# Patient Record
Sex: MALE | Race: BLACK OR AFRICAN AMERICAN | Employment: OTHER
[De-identification: names, ages, dates, MRNs, and addresses within clinical notes are randomized per-mention and may not be internally consistent; named-entity substitution may affect disease eponyms.]

---

## 2017-01-06 ENCOUNTER — TELEPHONE (OUTPATIENT)
Dept: CARDIOTHORACIC SURGERY | Facility: CLINIC | Age: 67
End: 2017-01-06

## 2017-01-06 DIAGNOSIS — I71.21 ASCENDING AORTIC ANEURYSM: Primary | ICD-10-CM

## 2017-08-22 ENCOUNTER — HOSPITAL ENCOUNTER (EMERGENCY)
Age: 67
Discharge: HOME | End: 2017-08-22
Attending: EMERGENCY MEDICINE
Payer: MEDICARE

## 2017-08-22 ENCOUNTER — APPOINTMENT (OUTPATIENT)
Dept: CT IMAGING | Age: 67
End: 2017-08-22
Payer: MEDICARE

## 2017-08-22 VITALS
BODY MASS INDEX: 23.17 KG/M2 | HEIGHT: 72 IN | DIASTOLIC BLOOD PRESSURE: 80 MMHG | SYSTOLIC BLOOD PRESSURE: 140 MMHG | TEMPERATURE: 97.3 F | OXYGEN SATURATION: 99 % | HEART RATE: 72 BPM | WEIGHT: 171.08 LBS | RESPIRATION RATE: 16 BRPM

## 2017-08-22 DIAGNOSIS — R51.9 HEADACHE, UNSPECIFIED HEADACHE TYPE: Primary | ICD-10-CM

## 2017-08-22 LAB
ABSOLUTE EOS #: 0 K/UL (ref 0–0.4)
ABSOLUTE LYMPH #: 0.9 K/UL (ref 1–4.8)
ABSOLUTE MONO #: 0.2 K/UL (ref 0.2–0.8)
ANION GAP SERPL CALCULATED.3IONS-SCNC: 11 MMOL/L (ref 9–17)
BASOPHILS # BLD: 1 %
BASOPHILS ABSOLUTE: 0 K/UL (ref 0–0.2)
BILIRUBIN URINE: NEGATIVE
BUN BLDV-MCNC: 17 MG/DL (ref 8–23)
BUN/CREAT BLD: 15 (ref 9–20)
CALCIUM SERPL-MCNC: 9.3 MG/DL (ref 8.6–10.4)
CHLORIDE BLD-SCNC: 102 MMOL/L (ref 98–107)
CO2: 27 MMOL/L (ref 20–31)
COLOR: YELLOW
COMMENT UA: NORMAL
CREAT SERPL-MCNC: 1.11 MG/DL (ref 0.7–1.2)
DIFFERENTIAL TYPE: ABNORMAL
EOSINOPHILS RELATIVE PERCENT: 1 %
GFR AFRICAN AMERICAN: >60 ML/MIN
GFR NON-AFRICAN AMERICAN: >60 ML/MIN
GFR SERPL CREATININE-BSD FRML MDRD: ABNORMAL ML/MIN/{1.73_M2}
GFR SERPL CREATININE-BSD FRML MDRD: ABNORMAL ML/MIN/{1.73_M2}
GLUCOSE BLD-MCNC: 102 MG/DL (ref 70–99)
GLUCOSE URINE: NEGATIVE
HCT VFR BLD CALC: 43.3 % (ref 41–53)
HEMOGLOBIN: 14.4 G/DL (ref 13.5–17.5)
KETONES, URINE: NEGATIVE
LEUKOCYTE ESTERASE, URINE: NEGATIVE
LYMPHOCYTES # BLD: 26 %
MCH RBC QN AUTO: 29.4 PG (ref 26–34)
MCHC RBC AUTO-ENTMCNC: 33.3 G/DL (ref 31–37)
MCV RBC AUTO: 88.4 FL (ref 80–100)
MONOCYTES # BLD: 7 %
NITRITE, URINE: NEGATIVE
PDW BLD-RTO: 13.5 % (ref 11.5–14.5)
PH UA: 6 (ref 5–8)
PLATELET # BLD: 151 K/UL (ref 130–400)
PLATELET ESTIMATE: ABNORMAL
PMV BLD AUTO: ABNORMAL FL (ref 6–12)
POTASSIUM SERPL-SCNC: 4.4 MMOL/L (ref 3.7–5.3)
PROTEIN UA: NEGATIVE
RBC # BLD: 4.9 M/UL (ref 4.5–5.9)
RBC # BLD: ABNORMAL 10*6/UL
SEG NEUTROPHILS: 65 %
SEGMENTED NEUTROPHILS ABSOLUTE COUNT: 2.5 K/UL (ref 1.8–7.7)
SODIUM BLD-SCNC: 140 MMOL/L (ref 135–144)
SPECIFIC GRAVITY UA: 1.01 (ref 1–1.03)
TURBIDITY: CLEAR
URINE HGB: NEGATIVE
UROBILINOGEN, URINE: NORMAL
WBC # BLD: 3.6 K/UL (ref 3.5–11)
WBC # BLD: ABNORMAL 10*3/UL

## 2017-08-22 PROCEDURE — 2580000003 HC RX 258: Performed by: NURSE PRACTITIONER

## 2017-08-22 PROCEDURE — 85025 COMPLETE CBC W/AUTO DIFF WBC: CPT

## 2017-08-22 PROCEDURE — 99284 EMERGENCY DEPT VISIT MOD MDM: CPT

## 2017-08-22 PROCEDURE — 81003 URINALYSIS AUTO W/O SCOPE: CPT

## 2017-08-22 PROCEDURE — 6360000002 HC RX W HCPCS: Performed by: NURSE PRACTITIONER

## 2017-08-22 PROCEDURE — 96374 THER/PROPH/DIAG INJ IV PUSH: CPT

## 2017-08-22 PROCEDURE — 80048 BASIC METABOLIC PNL TOTAL CA: CPT

## 2017-08-22 PROCEDURE — 6370000000 HC RX 637 (ALT 250 FOR IP): Performed by: NURSE PRACTITIONER

## 2017-08-22 PROCEDURE — 70450 CT HEAD/BRAIN W/O DYE: CPT

## 2017-08-22 RX ORDER — 0.9 % SODIUM CHLORIDE 0.9 %
1000 INTRAVENOUS SOLUTION INTRAVENOUS ONCE
Status: COMPLETED | OUTPATIENT
Start: 2017-08-22 | End: 2017-08-22

## 2017-08-22 RX ORDER — ACETAMINOPHEN 500 MG
1000 TABLET ORAL ONCE
Status: COMPLETED | OUTPATIENT
Start: 2017-08-22 | End: 2017-08-22

## 2017-08-22 RX ORDER — ONDANSETRON 2 MG/ML
4 INJECTION INTRAMUSCULAR; INTRAVENOUS ONCE
Status: COMPLETED | OUTPATIENT
Start: 2017-08-22 | End: 2017-08-22

## 2017-08-22 RX ORDER — ONDANSETRON 4 MG/1
4 TABLET, ORALLY DISINTEGRATING ORAL EVERY 8 HOURS PRN
Qty: 12 TABLET | Refills: 0 | Status: SHIPPED | OUTPATIENT
Start: 2017-08-22 | End: 2019-05-24

## 2017-08-22 RX ORDER — BUTALBITAL, ACETAMINOPHEN AND CAFFEINE 50; 325; 40 MG/1; MG/1; MG/1
1 TABLET ORAL EVERY 4 HOURS PRN
Qty: 12 TABLET | Refills: 0 | Status: SHIPPED | OUTPATIENT
Start: 2017-08-22 | End: 2019-05-24

## 2017-08-22 RX ADMIN — SODIUM CHLORIDE 1000 ML: 9 INJECTION, SOLUTION INTRAVENOUS at 12:23

## 2017-08-22 RX ADMIN — ONDANSETRON 4 MG: 2 INJECTION INTRAMUSCULAR; INTRAVENOUS at 12:24

## 2017-08-22 RX ADMIN — ACETAMINOPHEN 1000 MG: 500 TABLET ORAL at 12:25

## 2017-08-22 ASSESSMENT — ENCOUNTER SYMPTOMS
NAUSEA: 1
SORE THROAT: 0
ABDOMINAL PAIN: 0
VOMITING: 0
SINUS PRESSURE: 1
COUGH: 0
DIARRHEA: 1
SHORTNESS OF BREATH: 0

## 2017-08-22 ASSESSMENT — PAIN DESCRIPTION - LOCATION: LOCATION: HEAD

## 2017-08-22 ASSESSMENT — PAIN DESCRIPTION - PAIN TYPE: TYPE: ACUTE PAIN

## 2017-08-22 ASSESSMENT — PAIN DESCRIPTION - DESCRIPTORS: DESCRIPTORS: THROBBING

## 2017-08-22 ASSESSMENT — PAIN SCALES - GENERAL
PAINLEVEL_OUTOF10: 8
PAINLEVEL_OUTOF10: 10
PAINLEVEL_OUTOF10: 3

## 2018-01-30 ENCOUNTER — TELEPHONE (OUTPATIENT)
Dept: CARDIOTHORACIC SURGERY | Age: 68
End: 2018-01-30

## 2018-01-30 DIAGNOSIS — I71.21 ASCENDING AORTIC ANEURYSM: Primary | ICD-10-CM

## 2018-02-19 ENCOUNTER — HOSPITAL ENCOUNTER (OUTPATIENT)
Dept: CT IMAGING | Age: 68
Discharge: HOME | End: 2018-02-21
Payer: MEDICARE

## 2018-02-19 DIAGNOSIS — I71.21 ASCENDING AORTIC ANEURYSM: ICD-10-CM

## 2018-02-19 LAB
GFR NON-AFRICAN AMERICAN: 53 ML/MIN
GFR SERPL CREATININE-BSD FRML MDRD: >60 ML/MIN
GFR SERPL CREATININE-BSD FRML MDRD: ABNORMAL ML/MIN/{1.73_M2}
POC CREATININE: 1.34 MG/DL (ref 0.51–1.19)

## 2018-02-19 PROCEDURE — 6360000004 HC RX CONTRAST MEDICATION: Performed by: THORACIC SURGERY (CARDIOTHORACIC VASCULAR SURGERY)

## 2018-02-19 PROCEDURE — 82565 ASSAY OF CREATININE: CPT

## 2018-02-19 PROCEDURE — 71275 CT ANGIOGRAPHY CHEST: CPT

## 2018-02-19 RX ADMIN — IOPAMIDOL 75 ML: 755 INJECTION, SOLUTION INTRAVENOUS at 09:22

## 2018-03-05 ENCOUNTER — TELEPHONE (OUTPATIENT)
Dept: CARDIOTHORACIC SURGERY | Age: 68
End: 2018-03-05

## 2018-03-05 NOTE — TELEPHONE ENCOUNTER
Called patient per Dr Ernesto Flores and told him there was no change in his most recent CTA scan when compared to his previous CTA. Dr Ernesto Flores would like to repeat in 2 years. Patient verbalized understanding.

## 2019-01-16 ENCOUNTER — HOSPITAL ENCOUNTER (OUTPATIENT)
Dept: NON INVASIVE DIAGNOSTICS | Age: 69
Discharge: HOME | End: 2019-01-16
Payer: MEDICARE

## 2019-01-16 LAB
LV EF: 60 %
LVEF MODALITY: NORMAL

## 2019-01-16 PROCEDURE — 93306 TTE W/DOPPLER COMPLETE: CPT

## 2019-05-24 ENCOUNTER — HOSPITAL ENCOUNTER (EMERGENCY)
Age: 69
Discharge: HOME | End: 2019-05-24
Attending: EMERGENCY MEDICINE
Payer: MEDICARE

## 2019-05-24 VITALS
DIASTOLIC BLOOD PRESSURE: 92 MMHG | WEIGHT: 179.06 LBS | RESPIRATION RATE: 20 BRPM | BODY MASS INDEX: 24.25 KG/M2 | OXYGEN SATURATION: 99 % | SYSTOLIC BLOOD PRESSURE: 162 MMHG | HEART RATE: 66 BPM | HEIGHT: 72 IN | TEMPERATURE: 97.5 F

## 2019-05-24 DIAGNOSIS — M54.12 CERVICAL RADICULOPATHY: ICD-10-CM

## 2019-05-24 DIAGNOSIS — M62.838 TRAPEZIUS MUSCLE SPASM: Primary | ICD-10-CM

## 2019-05-24 PROCEDURE — 99283 EMERGENCY DEPT VISIT LOW MDM: CPT

## 2019-05-24 PROCEDURE — 6360000002 HC RX W HCPCS: Performed by: EMERGENCY MEDICINE

## 2019-05-24 PROCEDURE — 96372 THER/PROPH/DIAG INJ SC/IM: CPT

## 2019-05-24 RX ORDER — CYCLOBENZAPRINE HCL 10 MG
10 TABLET ORAL 2 TIMES DAILY PRN
Qty: 14 TABLET | Refills: 0 | Status: SHIPPED | OUTPATIENT
Start: 2019-05-24 | End: 2019-05-31

## 2019-05-24 RX ORDER — MONTELUKAST SODIUM 10 MG/1
10 TABLET ORAL NIGHTLY
COMMUNITY

## 2019-05-24 RX ORDER — IBUPROFEN 600 MG/1
600 TABLET ORAL
Qty: 20 TABLET | Refills: 0 | Status: SHIPPED | OUTPATIENT
Start: 2019-05-24 | End: 2019-05-31

## 2019-05-24 RX ORDER — KETOROLAC TROMETHAMINE 30 MG/ML
60 INJECTION, SOLUTION INTRAMUSCULAR; INTRAVENOUS ONCE
Status: COMPLETED | OUTPATIENT
Start: 2019-05-24 | End: 2019-05-24

## 2019-05-24 RX ADMIN — KETOROLAC TROMETHAMINE 60 MG: 30 INJECTION, SOLUTION INTRAMUSCULAR at 04:49

## 2019-05-24 ASSESSMENT — ENCOUNTER SYMPTOMS: BACK PAIN: 0

## 2019-05-24 ASSESSMENT — PAIN SCALES - GENERAL: PAINLEVEL_OUTOF10: 9

## 2019-05-24 NOTE — ED NOTES
Patient presents to ED via private auto c/o rt shoulder pain onset 6 days ago. Denies any injury. Stated he thought it would just get better on its own but it seems to have gotten worse. He has tried heat, cold and naprosyn with no relief. Stated there is some numbness and tingling in the palm of his hand.       Sukumar Garcia RN  05/24/19 3373

## 2019-05-24 NOTE — ED PROVIDER NOTES
EMERGENCY DEPARTMENT ENCOUNTER    Pt Name: Debbie Sharma  MRN: [id number]  [place] 1950  Date of evaluation: 5/24/19  CHIEF COMPLAINT       Chief Complaint   Patient presents with    Shoulder Pain     HISTORY OF PRESENT ILLNESS   Presents with c/o right shoulder pain since 5/18. Points to trapezius muscle. No injury. Moved down to deltoid. Some tingling in his palm. No change in . Denies tearing or ripping pain. H/o cervical fusion. The history is provided by the patient. Shoulder Problem   Location:  Shoulder  Shoulder location:  R shoulder  Injury: no    Pain details:     Quality: pulling. Severity:  Moderate    Onset quality:  Gradual    Duration:  6 days    Timing:  Constant    Progression:  Worsening  Handedness:  Right-handed  Relieved by:  Nothing  Worsened by: Movement and stretching area (direct pressure)  Ineffective treatments:  NSAIDs and heat (Naprosyn)  Associated symptoms: tingling (palm)    Associated symptoms: no back pain and no muscle weakness        REVIEW OF SYSTEMS     Review of Systems   Musculoskeletal: Negative for back pain. Neurological: Negative for weakness. PASTMEDICAL HISTORY     Past Medical History:   Diagnosis Date    Aortic aneurysm (HCC)     Asthma     GERD (gastroesophageal reflux disease)     Hypertension      SURGICAL HISTORY       Past Surgical History:   Procedure Laterality Date    SPINE SURGERY       CURRENT MEDICATIONS       Previous Medications    ALBUTEROL (PROVENTIL) (2.5 MG/3ML) 0.083% NEBULIZER SOLUTION    Take 2.5 mg by nebulization every 6 hours as needed. ALBUTEROL SULFATE (PROAIR HFA IN)    Inhale 2 puffs into the lungs daily. ATORVASTATIN (LIPITOR) 10 MG TABLET    Take 10 mg by mouth daily. LISINOPRIL (PRINIVIL;ZESTRIL) 5 MG TABLET    Take 10 mg by mouth daily. METOPROLOL (LOPRESSOR) 25 MG TABLET    Take 25 mg by mouth daily.     MONTELUKAST (SINGULAIR) 10 MG TABLET    Take 10 mg by mouth nightly    MULTIPLE VITAMINS-MINERALS (MULTIVITAMIN & MINERAL PO)    Take 1 tablet by mouth daily. OMEGA-3 FATTY ACIDS (OMEGA 3 PO)    Take 2 tablets by mouth daily. OMEPRAZOLE (PRILOSEC) 20 MG CAPSULE    Take 20 mg by mouth daily. ALLERGIES     has No Known Allergies. FAMILY HISTORY     indicated that his mother is . He indicated that his father is . He indicated that two of his three brothers are alive. SOCIAL HISTORY       Social History     Tobacco Use    Smoking status: Never Smoker    Smokeless tobacco: Never Used   Substance Use Topics    Alcohol use: No    Drug use: No     PHYSICAL EXAM     INITIAL VITALS: BP (!) 162/92   Pulse 66   Temp 97.5 °F (36.4 °C)   Resp 20   Ht 6' (1.829 m)   Wt 179 lb 1 oz (81.2 kg)   SpO2 99%   BMI 24.29 kg/m²    Physical Exam   Constitutional: He appears well-developed and well-nourished. He appears distressed. HENT:   Mouth/Throat: Oropharynx is clear and moist.   Eyes: Conjunctivae are normal. No scleral icterus. Neck: Muscular tenderness (right lower lateral) present. No spinous process tenderness present. Cardiovascular: Regular rhythm and intact distal pulses. Bradycardia present. Right radial pulse intact   Musculoskeletal: Normal range of motion. He exhibits tenderness. He exhibits no edema or deformity. Significant tenderness to right trapezius muscle   Neurological: He is alert. He has normal strength. No sensory deficit. Distal neuro function intact in right upper extremity   Skin: Capillary refill takes less than 2 seconds. No rash noted. No erythema. Psychiatric: He has a normal mood and affect. His behavior is normal.       MEDICAL DECISION MAKING:   Presentation c/w muscle strain or spasm. Endorses tingling in right hand. Could be related to cervical radiculopathy. H/o aneurysm of ascending aorta. Presentation is not c/w aortic pathology. Plan for Toradol and sling.  Sling provided at patient's request    Declines Rx for Prednisone. Rx for Motrin and Flexeril provided. Side effects discussed. Stop Naprosyn. Outpatient f/u indicated. Need for MRI C-spine left to discretion of f/u physician. Educated on s/s aortic dissection. Return immediately for any concerns. CRITICAL CARE:       PROCEDURES:    Procedures    DIAGNOSTIC RESULTS   EKG:All EKG's are interpreted by the Emergency Department Physician who either signs or Co-signs this chart in the absence of a cardiologist.        RADIOLOGY:All plain film, CT, MRI, and formal ultrasound images (except ED bedside ultrasound) are read by the radiologist, see reports below, unless otherwisenoted in MDM or here. No orders to display     LABS: All lab results were reviewed by myself, and all abnormals are listed below. Labs Reviewed - No data to display    EMERGENCY DEPARTMENTCOURSE:         Vitals:    Vitals:    05/24/19 0427 05/24/19 0505   BP: (!) 163/102 (!) 162/92   Pulse: 57 66   Resp: 20    Temp: 97.5 °F (36.4 °C)    SpO2: 99%    Weight: 179 lb 1 oz (81.2 kg)    Height: 6' (1.829 m)        The patient was given the following medications while in the emergency department:  Orders Placed This Encounter   Medications    ketorolac (TORADOL) injection 60 mg    ibuprofen (IBU) 600 MG tablet     Sig: Take 1 tablet by mouth 3 times daily (with meals) for 7 days     Dispense:  20 tablet     Refill:  0    cyclobenzaprine (FLEXERIL) 10 MG tablet     Sig: Take 1 tablet by mouth 2 times daily as needed for Muscle spasms     Dispense:  14 tablet     Refill:  0     CONSULTS:  None    FINAL IMPRESSION      1. Trapezius muscle spasm    2.  Cervical radiculopathy          DISPOSITION/PLAN   DISPOSITION Decision To Discharge 05/24/2019 05:24:50 AM      PATIENT REFERRED TO:  Sarbjit Chase MD  [address] [address]    In 1 week      DISCHARGE MEDICATIONS:  New Prescriptions    CYCLOBENZAPRINE (FLEXERIL) 10 MG TABLET    Take 1 tablet by mouth 2 times daily as needed for Muscle spasms    IBUPROFEN (IBU) 600 MG TABLET    Take 1 tablet by mouth 3 times daily (with meals) for 7 days     Belinda Celeste MD  Attending Emergency Physician                    Adiel Henderson MD  05/24/19 1834

## 2019-05-29 ENCOUNTER — HOSPITAL ENCOUNTER (OUTPATIENT)
Dept: GENERAL RADIOLOGY | Age: 69
Discharge: HOME | End: 2019-05-31
Payer: MEDICARE

## 2019-05-29 ENCOUNTER — HOSPITAL ENCOUNTER (OUTPATIENT)
Age: 69
Discharge: HOME | End: 2019-05-31
Payer: MEDICARE

## 2019-05-29 DIAGNOSIS — M25.511 ACUTE PAIN OF RIGHT SHOULDER: ICD-10-CM

## 2019-05-29 PROCEDURE — 73030 X-RAY EXAM OF SHOULDER: CPT

## 2020-01-07 DIAGNOSIS — I72.9 ANEURYSM (HCC): Primary | ICD-10-CM

## 2020-01-08 ENCOUNTER — HOSPITAL ENCOUNTER (OUTPATIENT)
Dept: CT IMAGING | Age: 70
Discharge: HOME | End: 2020-01-10
Payer: MEDICARE

## 2020-01-08 ENCOUNTER — HOSPITAL ENCOUNTER (OUTPATIENT)
Age: 70
Discharge: HOME | End: 2020-01-08
Payer: MEDICARE

## 2020-01-08 LAB
CREAT SERPL-MCNC: 1 MG/DL (ref 0.7–1.2)
GFR AFRICAN AMERICAN: >60 ML/MIN
GFR NON-AFRICAN AMERICAN: >60 ML/MIN
GFR SERPL CREATININE-BSD FRML MDRD: NORMAL ML/MIN/{1.73_M2}
GFR SERPL CREATININE-BSD FRML MDRD: NORMAL ML/MIN/{1.73_M2}

## 2020-01-08 PROCEDURE — 6360000004 HC RX CONTRAST MEDICATION: Performed by: INTERNAL MEDICINE

## 2020-01-08 PROCEDURE — 82565 ASSAY OF CREATININE: CPT

## 2020-01-08 PROCEDURE — 36415 COLL VENOUS BLD VENIPUNCTURE: CPT

## 2020-01-08 PROCEDURE — 71275 CT ANGIOGRAPHY CHEST: CPT

## 2020-01-08 RX ADMIN — IOHEXOL 100 ML: 350 INJECTION, SOLUTION INTRAVENOUS at 11:01

## 2020-02-27 ENCOUNTER — TELEPHONE (OUTPATIENT)
Dept: CARDIOTHORACIC SURGERY | Age: 70
End: 2020-02-27

## 2020-02-27 NOTE — TELEPHONE ENCOUNTER
Called pt LVM to call back to make a follow up appt with Dr. Vianey Radford pt was a Dr. Dai Hoang pt for ascending aortic aneurysm.  Pt got a CTA Cgest on 1/8/2020

## 2021-01-14 DIAGNOSIS — I71.20 THORACIC AORTIC ANEURYSM WITHOUT RUPTURE: Primary | ICD-10-CM

## 2021-01-18 ENCOUNTER — TELEPHONE (OUTPATIENT)
Dept: CARDIOTHORACIC SURGERY | Age: 71
End: 2021-01-18

## 2021-01-18 DIAGNOSIS — I71.20 THORACIC AORTIC ANEURYSM WITHOUT RUPTURE: Primary | ICD-10-CM

## 2021-01-18 DIAGNOSIS — I71.20 THORACIC AORTIC ANEURYSM WITHOUT RUPTURE (HCC): Primary | ICD-10-CM

## 2021-01-18 NOTE — TELEPHONE ENCOUNTER
Scheduled patient for CTA Chest tomorrow at 10am with a 9am arrival time. LVM for patient with information about testing. Asked patient to call us back to confirm that he will not be coming.

## 2021-01-18 NOTE — PATIENT INSTRUCTIONS
· If your doctor recommends it, get more exercise. Walking is a good choice. Bit by bit, increase the amount you walk every day. Try for at least 30 minutes on most days of the week. You also may want to swim, bike, or do other activities. When should you call for help? Call 911 anytime you think you may need emergency care. For example, call if:    · You have sudden chest pain and shortness of breath, or you cough up blood.     · You passed out (lost consciousness). Call your doctor now or seek immediate medical care if:    · You have any new chest pain. Watch closely for changes in your health, and be sure to contact your doctor if:    · You have any problems making your doctor visits.     · You do not get better as expected. Where can you learn more? Go to [email or web address] and sign in to your [employer] account. Enter 21 [phone] in the Navut box to learn more about \"Thoracic Aortic Aneurysm: Care Instructions. \"     If you do not have an account, please click on the \"Sign Up Now\" link. Current as of: March 4, 2020               Content Version: 12.6  © 3030-4285 [employer]. Care instructions adapted under license by [employer]. If you have questions about a medical condition or this instruction, always ask your healthcare professional. [address] any warranty or liability for your use of this information.

## 2021-01-19 ENCOUNTER — HOSPITAL ENCOUNTER (OUTPATIENT)
Dept: CT IMAGING | Age: 71
Discharge: HOME | End: 2021-01-21
Payer: MEDICARE

## 2021-01-19 ENCOUNTER — HOSPITAL ENCOUNTER (OUTPATIENT)
Age: 71
Discharge: HOME | End: 2021-01-19
Payer: MEDICARE

## 2021-01-19 DIAGNOSIS — I71.20 THORACIC AORTIC ANEURYSM WITHOUT RUPTURE: ICD-10-CM

## 2021-01-19 LAB
CREAT SERPL-MCNC: 1.23 MG/DL (ref 0.7–1.2)
GFR AFRICAN AMERICAN: >60 ML/MIN
GFR NON-AFRICAN AMERICAN: 58 ML/MIN
GFR SERPL CREATININE-BSD FRML MDRD: ABNORMAL ML/MIN/{1.73_M2}
GFR SERPL CREATININE-BSD FRML MDRD: ABNORMAL ML/MIN/{1.73_M2}

## 2021-01-19 PROCEDURE — 71275 CT ANGIOGRAPHY CHEST: CPT

## 2021-01-19 PROCEDURE — 82565 ASSAY OF CREATININE: CPT

## 2021-01-19 PROCEDURE — 6360000004 HC RX CONTRAST MEDICATION: Performed by: THORACIC SURGERY (CARDIOTHORACIC VASCULAR SURGERY)

## 2021-01-19 PROCEDURE — 36415 COLL VENOUS BLD VENIPUNCTURE: CPT

## 2021-01-19 RX ADMIN — IOPAMIDOL 100 ML: 755 INJECTION, SOLUTION INTRAVENOUS at 10:00

## 2021-01-20 ENCOUNTER — OFFICE VISIT (OUTPATIENT)
Dept: CARDIOTHORACIC SURGERY | Age: 71
End: 2021-01-20
Payer: MEDICARE

## 2021-01-20 ENCOUNTER — TELEPHONE (OUTPATIENT)
Dept: CARDIOTHORACIC SURGERY | Age: 71
End: 2021-01-20

## 2021-01-20 VITALS
WEIGHT: 177 LBS | BODY MASS INDEX: 23.98 KG/M2 | RESPIRATION RATE: 18 BRPM | DIASTOLIC BLOOD PRESSURE: 82 MMHG | OXYGEN SATURATION: 98 % | HEIGHT: 72 IN | SYSTOLIC BLOOD PRESSURE: 126 MMHG | HEART RATE: 72 BPM | TEMPERATURE: 97.2 F

## 2021-01-20 DIAGNOSIS — I71.20 THORACIC AORTIC ANEURYSM WITHOUT RUPTURE: Primary | ICD-10-CM

## 2021-01-20 PROCEDURE — G8484 FLU IMMUNIZE NO ADMIN: HCPCS | Performed by: NURSE PRACTITIONER

## 2021-01-20 PROCEDURE — 3017F COLORECTAL CA SCREEN DOC REV: CPT | Performed by: NURSE PRACTITIONER

## 2021-01-20 PROCEDURE — 4040F PNEUMOC VAC/ADMIN/RCVD: CPT | Performed by: NURSE PRACTITIONER

## 2021-01-20 PROCEDURE — 99214 OFFICE O/P EST MOD 30 MIN: CPT | Performed by: NURSE PRACTITIONER

## 2021-01-20 PROCEDURE — 1036F TOBACCO NON-USER: CPT | Performed by: NURSE PRACTITIONER

## 2021-01-20 PROCEDURE — G8420 CALC BMI NORM PARAMETERS: HCPCS | Performed by: NURSE PRACTITIONER

## 2021-01-20 PROCEDURE — 1123F ACP DISCUSS/DSCN MKR DOCD: CPT | Performed by: NURSE PRACTITIONER

## 2021-01-20 PROCEDURE — G8427 DOCREV CUR MEDS BY ELIG CLIN: HCPCS | Performed by: NURSE PRACTITIONER

## 2021-01-20 NOTE — PROGRESS NOTES
Vitamins-Minerals (MULTIVITAMIN & MINERAL PO), Take 1 tablet by mouth daily. , Disp: , Rfl:     Omega-3 Fatty Acids (OMEGA 3 PO), Take 2 tablets by mouth daily. , Disp: , Rfl:     ibuprofen (IBU) 600 MG tablet, Take 1 tablet by mouth 3 times daily (with meals) for 7 days, Disp: 20 tablet, Rfl: 0    Past Surgical History:   Procedure Laterality Date    SPINE SURGERY         Social Hx: reports that he has never smoked. He has never used smokeless tobacco.    Assessment & Plan:   Patient will continue with yearly CT chest scans. Patient does not have to come into office for next visit if he is asymptomatic.   We can discuss findings over the phone    Antonio Morrison CNP

## 2021-05-18 ENCOUNTER — APPOINTMENT (OUTPATIENT)
Dept: CT IMAGING | Age: 71
End: 2021-05-18
Payer: MEDICARE

## 2021-05-18 ENCOUNTER — HOSPITAL ENCOUNTER (EMERGENCY)
Age: 71
Discharge: HOME | End: 2021-05-18
Attending: EMERGENCY MEDICINE
Payer: MEDICARE

## 2021-05-18 VITALS
BODY MASS INDEX: 23.87 KG/M2 | RESPIRATION RATE: 14 BRPM | TEMPERATURE: 98.4 F | SYSTOLIC BLOOD PRESSURE: 129 MMHG | HEART RATE: 73 BPM | OXYGEN SATURATION: 98 % | DIASTOLIC BLOOD PRESSURE: 73 MMHG | WEIGHT: 176 LBS
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
## 2021-05-18 DIAGNOSIS — S16.1XXA ACUTE STRAIN OF NECK MUSCLE, INITIAL ENCOUNTER: ICD-10-CM

## 2021-05-18 PROCEDURE — 72125 CT NECK SPINE W/O DYE: CPT

## 2021-05-18 PROCEDURE — 70450 CT HEAD/BRAIN W/O DYE: CPT

## 2021-05-18 PROCEDURE — 99283 EMERGENCY DEPT VISIT LOW MDM: CPT

## 2021-05-18 PROCEDURE — 6370000000 HC RX 637 (ALT 250 FOR IP): Performed by: PHYSICIAN ASSISTANT

## 2021-05-18 RX ORDER — METAXALONE 800 MG/1
800 TABLET ORAL ONCE
Status: COMPLETED | OUTPATIENT
Start: 2021-05-18 | End: 2021-05-18

## 2021-05-18 RX ORDER — ACETAMINOPHEN 500 MG
1000 TABLET ORAL ONCE
Status: COMPLETED | OUTPATIENT
Start: 2021-05-18 | End: 2021-05-18

## 2021-05-18 RX ADMIN — METAXALONE 800 MG: 800 TABLET ORAL at 19:02

## 2021-05-18 RX ADMIN — ACETAMINOPHEN 1000 MG: 500 TABLET ORAL at 19:02

## 2021-05-18 ASSESSMENT — PAIN SCALES - GENERAL
PAINLEVEL_OUTOF10: 6
PAINLEVEL_OUTOF10: 5
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
## 2022-01-25 DIAGNOSIS — I77.89 ENLARGED AORTA (HCC): Primary | ICD-10-CM

## 2022-02-08 ENCOUNTER — HOSPITAL ENCOUNTER (OUTPATIENT)
Dept: CT IMAGING | Age: 72
Discharge: HOME | End: 2022-02-10
Payer: MEDICARE

## 2022-02-08 DIAGNOSIS — I77.89 ENLARGED AORTA (HCC): ICD-10-CM

## 2022-02-08 PROCEDURE — 71250 CT THORAX DX C-: CPT

## 2022-03-07 ENCOUNTER — TELEPHONE (OUTPATIENT)
Dept: CARDIOTHORACIC SURGERY | Age: 72
End: 2022-03-07

## 2022-03-07 NOTE — TELEPHONE ENCOUNTER
----- Message from HARPER Morfin NP sent at 3/7/2022  9:57 AM EST -----  Yes I did. I will call when I get a chance.  Thanks Ishaan Oneill  ----- Message -----  From: Johnny Kraus MA  Sent: 2/22/2022  12:45 PM EST  To: HARPER Morfin NP, #    Patient called in regards to a recent test that he has done CT of the chest in your last note you said that it can if he is asymptomatic you said that he can discuss the test over the phone Work on weight loss and reduce caloric intake   Regular exercise  Repeat LFTs in 6 months

## 2022-10-17 ENCOUNTER — TRANSCRIBE ORDERS (OUTPATIENT)
Dept: ADMINISTRATIVE | Age: 72
End: 2022-10-17

## 2022-10-17 DIAGNOSIS — I71.20 THORACIC AORTIC ANEURYSM WITHOUT RUPTURE, UNSPECIFIED PART: Primary | ICD-10-CM

## 2022-10-27 ENCOUNTER — HOSPITAL ENCOUNTER (OUTPATIENT)
Dept: CT IMAGING | Age: 72
Discharge: HOME | End: 2022-10-29
Payer: MEDICARE

## 2022-10-27 DIAGNOSIS — I71.20 THORACIC AORTIC ANEURYSM WITHOUT RUPTURE, UNSPECIFIED PART: ICD-10-CM

## 2022-10-27 PROCEDURE — 71250 CT THORAX DX C-: CPT

## 2024-01-21 ENCOUNTER — APPOINTMENT (OUTPATIENT)
Dept: GENERAL RADIOLOGY | Age: 74
End: 2024-01-21
Payer: MEDICARE

## 2024-01-21 ENCOUNTER — HOSPITAL ENCOUNTER (EMERGENCY)
Age: 74
Discharge: HOME | End: 2024-01-21
Attending: EMERGENCY MEDICINE
Payer: MEDICARE

## 2024-01-21 VITALS
BODY MASS INDEX: 23.7 KG/M2 | OXYGEN SATURATION: 98 % | HEART RATE: 75 BPM | SYSTOLIC BLOOD PRESSURE: 147 MMHG | HEIGHT: 72 IN | DIASTOLIC BLOOD PRESSURE: 90 MMHG | WEIGHT: 175 LBS | RESPIRATION RATE: 18 BRPM | TEMPERATURE: 97.6 F

## 2024-01-21 DIAGNOSIS — M25.511 RIGHT SHOULDER PAIN, UNSPECIFIED CHRONICITY: Primary | ICD-10-CM

## 2024-01-21 PROCEDURE — 6360000002 HC RX W HCPCS: Performed by: NURSE PRACTITIONER

## 2024-01-21 PROCEDURE — 96372 THER/PROPH/DIAG INJ SC/IM: CPT

## 2024-01-21 PROCEDURE — 99284 EMERGENCY DEPT VISIT MOD MDM: CPT

## 2024-01-21 PROCEDURE — 73030 X-RAY EXAM OF SHOULDER: CPT

## 2024-01-21 PROCEDURE — 72040 X-RAY EXAM NECK SPINE 2-3 VW: CPT

## 2024-01-21 RX ORDER — PREDNISONE 20 MG/1
20 TABLET ORAL 2 TIMES DAILY
Qty: 10 TABLET | Refills: 0 | Status: SHIPPED | OUTPATIENT
Start: 2024-01-21 | End: 2024-01-26

## 2024-01-21 RX ORDER — DEXAMETHASONE SODIUM PHOSPHATE 10 MG/ML
8 INJECTION, SOLUTION INTRAMUSCULAR; INTRAVENOUS ONCE
Status: COMPLETED | OUTPATIENT
Start: 2024-01-21 | End: 2024-01-21

## 2024-01-21 RX ORDER — HYDROCODONE BITARTRATE AND ACETAMINOPHEN 5; 325 MG/1; MG/1
1 TABLET ORAL EVERY 8 HOURS PRN
Qty: 12 TABLET | Refills: 0 | Status: SHIPPED | OUTPATIENT
Start: 2024-01-21 | End: 2024-01-25

## 2024-01-21 RX ADMIN — DEXAMETHASONE SODIUM PHOSPHATE 8 MG: 10 INJECTION INTRAMUSCULAR; INTRAVENOUS at 15:24

## 2024-01-21 ASSESSMENT — PAIN DESCRIPTION - ORIENTATION: ORIENTATION: RIGHT

## 2024-01-21 ASSESSMENT — PAIN DESCRIPTION - PAIN TYPE: TYPE: ACUTE PAIN

## 2024-01-21 ASSESSMENT — PAIN - FUNCTIONAL ASSESSMENT: PAIN_FUNCTIONAL_ASSESSMENT: 0-10

## 2024-01-21 ASSESSMENT — PAIN DESCRIPTION - DESCRIPTORS: DESCRIPTORS: TEARING

## 2024-01-21 ASSESSMENT — PAIN DESCRIPTION - FREQUENCY: FREQUENCY: CONTINUOUS

## 2024-01-27 NOTE — ED PROVIDER NOTES
eMERGENCY dEPARTMENT eNCOUnter   Independent Attestation     Pt Name: Tam Evans  MRN: [id number]  Birthdate 1950  Date of evaluation: 1/26/24     Tam Evans is a 73 y.o. male with CC: Shoulder Pain (Right shoulder)        This visit was performed by both a physician and an APC. I performed all aspects of the MDM as documented.      Erica B Goldberger, MD  Attending Emergency Physician           Goldberger, Erica B, MD  01/26/24 2022    
1/21/2024  EXAMINATION: TWO XRAY VIEWS OF THE RIGHT SHOULDER; 3 XRAY VIEWS OF THE CERVICAL SPINE 1/21/2024 3:01 pm COMPARISON: Right shoulder 29 May 2019; CT cervical spine 18 May 2021 HISTORY: ORDERING SYSTEM PROVIDED HISTORY: pain TECHNOLOGIST PROVIDED HISTORY: pain Reason for Exam: Pt c/o pain to neck and right shoulder, denies any recent injury, hx of fall in past. FINDINGS: Right shoulder: Mild acromioclavicular and glenohumeral degenerative changes are noted, stable.  No acute fracture, dislocation or effusion noted. Cervical spine: Alignment satisfactory.  No acute fracture, subluxation or prevertebral soft tissue swelling is noted.  Patient is status post fusion and ACDF procedure C4 through C6 and fusion at C6-C7.  Degenerative and degenerative disc changes are present.     Right shoulder: Stable mild degenerative changes.  No acute osseous abnormality. Cervical spine: Postsurgical changes with intact hardware.  No acute fracture, subluxation or prevertebral soft tissue swelling is noted.       EMERGENCY DEPARTMENT COURSE and DIFFERENTIAL DIAGNOSIS/MDM:   Vitals:    Vitals:    01/21/24 1428   BP: (!) 147/90   Pulse: 75   Resp: 18   Temp: 97.6 °F (36.4 °C)   TempSrc: Oral   SpO2: 98%   Weight: 79.4 kg (175 lb)   Height: 1.829 m (6')         MEDICATIONS GIVEN IN THE ED:  Medications   dexAMETHasone (PF) (DECADRON) injection 8 mg (8 mg IntraMUSCular Given 1/21/24 1524)         ED ORDERS:  Orders Placed This Encounter   Procedures    XR SHOULDER RIGHT (MIN 2 VIEWS)     Standing Status:   Standing     Number of Occurrences:   1     Order Specific Question:   Reason for exam:     Answer:   pain    XR CERVICAL SPINE (2-3 VIEWS)     Standing Status:   Standing     Number of Occurrences:   1     Order Specific Question:   Reason for exam:     Answer:   pain         CLINICAL DECISION MAKING:  The patient presented alert with a nontoxic appearance.    The patient was involved in his/her plan of care through shared

## 2024-02-16 ENCOUNTER — HOSPITAL ENCOUNTER (EMERGENCY)
Age: 74
Discharge: HOME | End: 2024-02-16
Attending: EMERGENCY MEDICINE
Payer: MEDICARE

## 2024-02-16 VITALS
OXYGEN SATURATION: 99 % | DIASTOLIC BLOOD PRESSURE: 91 MMHG | RESPIRATION RATE: 16 BRPM | WEIGHT: 177 LBS | TEMPERATURE: 97.8 F | BODY MASS INDEX: 24.01 KG/M2 | HEART RATE: 65 BPM | SYSTOLIC BLOOD PRESSURE: 139 MMHG

## 2024-02-16 DIAGNOSIS — Z88.8 ALLERGY TO ACE INHIBITORS: ICD-10-CM

## 2024-02-16 DIAGNOSIS — R22.0 LIP SWELLING: Primary | ICD-10-CM

## 2024-02-16 PROCEDURE — 99282 EMERGENCY DEPT VISIT SF MDM: CPT

## 2024-02-16 RX ORDER — IPRATROPIUM BROMIDE AND ALBUTEROL SULFATE 2.5; .5 MG/3ML; MG/3ML
1 SOLUTION RESPIRATORY (INHALATION) EVERY 4 HOURS
COMMUNITY

## 2024-02-16 RX ORDER — MAGNESIUM OXIDE/MAG AA CHELATE 300 MG
350 CAPSULE ORAL DAILY
COMMUNITY

## 2024-02-16 NOTE — ED PROVIDER NOTES
Take 350 mg by mouth Daily    METOPROLOL (LOPRESSOR) 25 MG TABLET    Take 25 mg by mouth daily.    MONTELUKAST (SINGULAIR) 10 MG TABLET    Take 10 mg by mouth nightly    MULTIPLE VITAMINS-MINERALS (MULTIVITAMIN & MINERAL PO)    Take 1 tablet by mouth daily.    OMEGA-3 FATTY ACIDS (OMEGA 3 PO)    Take 2 tablets by mouth daily.    OMEPRAZOLE (PRILOSEC) 20 MG CAPSULE    Take 20 mg by mouth daily.     ALLERGIES     has No Known Allergies.  FAMILY HISTORY     He indicated that his mother is . He indicated that his father is . He indicated that two of his three brothers are alive.     SOCIAL HISTORY       Social History     Tobacco Use    Smoking status: Never    Smokeless tobacco: Never   Substance Use Topics    Alcohol use: No    Drug use: No     PHYSICAL EXAM     INITIAL VITALS: BP (!) 143/87   Pulse 65   Temp 97.8 °F (36.6 °C) (Oral)   Resp 16   Wt 80.3 kg (177 lb)   SpO2 98%   BMI 24.01 kg/m²    Physical Exam  Constitutional:       Appearance: Normal appearance.   HENT:      Head: Normocephalic.  Normal posterior oropharynx.  Eyes:      Conjunctiva/sclera: Conjunctivae normal.   Pulmonary:      No respiratory distress. CTAB.  Cardiovascular:      Rate and Rhythm: Regular rhythm.   Abdominal:      General: There is no distension.   Skin:     General: Skin is dry.  Slight swelling left lower lip without rash, break in skin.  Neurological:      Mental Status: Patient is alert. Mental status is at baseline.     MEDICAL DECISION MAKING / ED COURSE:     1)  Number and Complexity of Problems  Problem List This Visit: Swelling left lower lip.    Differential Diagnosis: Allergic reaction to lisinopril.  Viral infection.    Diagnoses Considered but Do Not Suspect:     Pertinent Comorbid Conditions:      2)  Data Reviewed  Patient's EKG independently interpreted by me: N/A    Decision Rules/Scores utilized:  N/A    HEART SCORE: N/A, no chest pain.    NIH STROKE SCALE: N/A, no focal neurodeficits.

## 2024-02-16 NOTE — DISCHARGE INSTRUCTIONS
Stop taking your lisinopril immediately.    Return to this emergency room immediately if your symptoms persist, worsen or if new ones form.    Make sure you follow-up with your primary care doctor within the next 1-2 business days.

## 2024-02-16 NOTE — ED NOTES
Patient presents to ER from home due to facial swelling. Patient states swelling to his lips started last night after dinner.Patient states having salmon and nuts which are apart of his usual diet.Patient states he takes lisinopril. Patient states lip swelling. Patient denies swelling of the tongue, SOB or difficulty breathing.Patient is A/O x 4, equal chest expansion with non labored breathing, wheels locked, bed in lowest position, call light in reach.Patient placed in pulse ox.

## 2024-10-22 NOTE — TELEPHONE ENCOUNTER
Per Ro Dick CNP, patient is to have a repeat CT in 1 year but we will call him with results. Patient does not need to follow up in office. normal gait and station , no tenderness or deformities present

## 2025-04-08 ENCOUNTER — APPOINTMENT (OUTPATIENT)
Dept: GENERAL RADIOLOGY | Age: 75
End: 2025-04-08
Payer: MEDICARE

## 2025-04-08 ENCOUNTER — APPOINTMENT (OUTPATIENT)
Dept: CT IMAGING | Age: 75
End: 2025-04-08
Payer: MEDICARE

## 2025-04-08 ENCOUNTER — HOSPITAL ENCOUNTER (EMERGENCY)
Age: 75
Discharge: HOME | End: 2025-04-08
Attending: EMERGENCY MEDICINE
Payer: MEDICARE

## 2025-04-08 VITALS
SYSTOLIC BLOOD PRESSURE: 139 MMHG | BODY MASS INDEX: 23.98 KG/M2 | OXYGEN SATURATION: 96 % | DIASTOLIC BLOOD PRESSURE: 88 MMHG | HEIGHT: 72 IN | TEMPERATURE: 98.2 F | WEIGHT: 177 LBS | HEART RATE: 64 BPM | RESPIRATION RATE: 16 BRPM

## 2025-04-08 DIAGNOSIS — M54.12 CERVICAL RADICULOPATHY: Primary | ICD-10-CM

## 2025-04-08 PROCEDURE — 6360000002 HC RX W HCPCS: Performed by: PHYSICIAN ASSISTANT

## 2025-04-08 PROCEDURE — 99284 EMERGENCY DEPT VISIT MOD MDM: CPT

## 2025-04-08 PROCEDURE — 6370000000 HC RX 637 (ALT 250 FOR IP): Performed by: PHYSICIAN ASSISTANT

## 2025-04-08 PROCEDURE — 72125 CT NECK SPINE W/O DYE: CPT

## 2025-04-08 PROCEDURE — 96372 THER/PROPH/DIAG INJ SC/IM: CPT

## 2025-04-08 PROCEDURE — 73030 X-RAY EXAM OF SHOULDER: CPT

## 2025-04-08 RX ORDER — NAPROXEN 500 MG/1
500 TABLET ORAL 2 TIMES DAILY WITH MEALS
Qty: 20 TABLET | Refills: 0 | Status: SHIPPED | OUTPATIENT
Start: 2025-04-08

## 2025-04-08 RX ORDER — METAXALONE 800 MG/1
800 TABLET ORAL ONCE
Status: COMPLETED | OUTPATIENT
Start: 2025-04-08 | End: 2025-04-08

## 2025-04-08 RX ORDER — KETOROLAC TROMETHAMINE 15 MG/ML
15 INJECTION, SOLUTION INTRAMUSCULAR; INTRAVENOUS ONCE
Status: COMPLETED | OUTPATIENT
Start: 2025-04-08 | End: 2025-04-08

## 2025-04-08 RX ORDER — METHOCARBAMOL 750 MG/1
750 TABLET, FILM COATED ORAL 4 TIMES DAILY
Qty: 40 TABLET | Refills: 0 | Status: SHIPPED | OUTPATIENT
Start: 2025-04-08 | End: 2025-04-18

## 2025-04-08 RX ADMIN — KETOROLAC TROMETHAMINE 15 MG: 15 INJECTION, SOLUTION INTRAMUSCULAR; INTRAVENOUS at 12:34

## 2025-04-08 RX ADMIN — METAXALONE 800 MG: 800 TABLET ORAL at 12:34

## 2025-04-08 ASSESSMENT — PAIN - FUNCTIONAL ASSESSMENT: PAIN_FUNCTIONAL_ASSESSMENT: 0-10

## 2025-04-08 ASSESSMENT — PAIN SCALES - GENERAL
PAINLEVEL_OUTOF10: 4
PAINLEVEL_OUTOF10: 9
PAINLEVEL_OUTOF10: 10

## 2025-04-08 ASSESSMENT — PAIN DESCRIPTION - LOCATION: LOCATION: ARM

## 2025-04-08 ASSESSMENT — PAIN DESCRIPTION - ORIENTATION: ORIENTATION: RIGHT

## 2025-04-08 NOTE — ED PROVIDER NOTES
[facility] EMERGENCY DEPARTMENT  eMERGENCY dEPARTMENTeNCOUnter      Pt Name: Tam Evans  MRN: [id number]  Birthdate 1950  Date ofevaluation: 4/8/2025  Provider: Patrick Siu PA-C    CHIEF COMPLAINT       Chief Complaint   Patient presents with    Arm Pain     Right sided, unable to lift arm without pain    Neck Pain     Pain to base of neck radiating down shoulder to back          HISTORY OF PRESENT ILLNESS  (Location/Symptom, Timing/Onset, Context/Setting, Quality, Duration, Modifying Factors, Severity.)   Tam Evans is a 74 y.o. male who presents to the emergency department with right arm pain.  Patient unable to lift the arm without pain over the last couple days the pain radiates from the shoulder and neck down the arm at times.  History of cervical radiculopathy and has hardware in the neck due to bulging disks history of surgical repair in early 2000's.      Nursing Notes were reviewed.    ALLERGIES     Lisinopril    CURRENT MEDICATIONS       Discharge Medication List as of 4/8/2025  2:41 PM        CONTINUE these medications which have NOT CHANGED    Details   ipratropium 0.5 mg-albuterol 2.5 mg (DUONEB) 0.5-2.5 (3) MG/3ML SOLN nebulizer solution Inhale 3 mLs into the lungs every 4 hoursHistorical Med      Magnesium 300 MG CAPS Take 350 mg by mouth DailyHistorical Med      bimatoprost (LUMIGAN) 0.01 % SOLN ophthalmic drops Place 1 drop into both eyes nightlyHistorical Med      montelukast (SINGULAIR) 10 MG tablet Take 10 mg by mouth nightlyHistorical Med      omeprazole (PRILOSEC) 20 MG capsule Take 20 mg by mouth daily.      atorvastatin (LIPITOR) 10 MG tablet Take 10 mg by mouth daily.      metoprolol (LOPRESSOR) 25 MG tablet Take 25 mg by mouth daily.      lisinopril (PRINIVIL;ZESTRIL) 5 MG tablet Take 10 mg by mouth daily.      albuterol (PROVENTIL) (2.5 MG/3ML) 0.083% nebulizer solution Take 2.5 mg by nebulization every 6 hours as needed.      Albuterol Sulfate (PROAIR HFA

## 2025-08-20 ENCOUNTER — TRANSCRIBE ORDERS (OUTPATIENT)
Dept: ADMINISTRATIVE | Age: 75
End: 2025-08-20

## 2025-08-20 DIAGNOSIS — I10 ESSENTIAL HYPERTENSION, BENIGN: ICD-10-CM

## 2025-08-20 DIAGNOSIS — E78.2 MIXED HYPERLIPIDEMIA: ICD-10-CM

## 2025-08-20 DIAGNOSIS — I71.21 ANEURYSM OF ASCENDING AORTA WITHOUT RUPTURE: Primary | ICD-10-CM

## 2025-09-05 ENCOUNTER — HOSPITAL ENCOUNTER (OUTPATIENT)
Dept: CT IMAGING | Age: 75
Discharge: HOME | End: 2025-09-05
Attending: INTERNAL MEDICINE
Payer: MEDICARE

## 2025-09-05 ENCOUNTER — HOSPITAL ENCOUNTER (OUTPATIENT)
Age: 75
End: 2025-09-05
Attending: INTERNAL MEDICINE
Payer: MEDICARE

## 2025-09-05 VITALS
DIASTOLIC BLOOD PRESSURE: 85 MMHG | SYSTOLIC BLOOD PRESSURE: 137 MMHG | HEIGHT: 72 IN | WEIGHT: 172 LBS | BODY MASS INDEX: 23.3 KG/M2

## 2025-09-05 DIAGNOSIS — E78.2 MIXED HYPERLIPIDEMIA: ICD-10-CM

## 2025-09-05 DIAGNOSIS — I71.21 ANEURYSM OF ASCENDING AORTA WITHOUT RUPTURE: ICD-10-CM

## 2025-09-05 DIAGNOSIS — I10 ESSENTIAL HYPERTENSION, BENIGN: ICD-10-CM

## 2025-09-05 LAB
ECHO AO ASC DIAM: 4 CM
ECHO AO ASCENDING AORTA INDEX: 2 CM/M2
ECHO AO ROOT DIAM: 3.8 CM
ECHO AO ROOT INDEX: 1.9 CM/M2
ECHO AV AREA PEAK VELOCITY: 1.9 CM2
ECHO AV AREA VTI: 2 CM2
ECHO AV AREA/BSA PEAK VELOCITY: 1 CM2/M2
ECHO AV AREA/BSA VTI: 1 CM2/M2
ECHO AV MEAN GRADIENT: 4 MMHG
ECHO AV MEAN VELOCITY: 1 M/S
ECHO AV PEAK GRADIENT: 9 MMHG
ECHO AV PEAK VELOCITY: 1.5 M/S
ECHO AV VELOCITY RATIO: 0.6
ECHO AV VTI: 31.2 CM
ECHO BSA: 1.99 M2
ECHO EST RA PRESSURE: 3 MMHG
ECHO LA AREA 2C: 13.6 CM2
ECHO LA AREA 4C: 14.1 CM2
ECHO LA DIAMETER INDEX: 1.65 CM/M2
ECHO LA DIAMETER: 3.3 CM
ECHO LA MAJOR AXIS: 4.8 CM
ECHO LA MINOR AXIS: 4.3 CM
ECHO LA TO AORTIC ROOT RATIO: 0.87
ECHO LA VOL BP: 37 ML (ref 18–58)
ECHO LA VOL MOD A2C: 36 ML (ref 18–58)
ECHO LA VOL MOD A4C: 34 ML (ref 18–58)
ECHO LA VOL/BSA BIPLANE: 19 ML/M2 (ref 16–34)
ECHO LA VOLUME INDEX MOD A2C: 18 ML/M2 (ref 16–34)
ECHO LA VOLUME INDEX MOD A4C: 17 ML/M2 (ref 16–34)
ECHO LV E' LATERAL VELOCITY: 7.4 CM/S
ECHO LV E' SEPTAL VELOCITY: 5.22 CM/S
ECHO LV EDV A2C: 70 ML
ECHO LV EDV A4C: 70 ML
ECHO LV EDV INDEX A4C: 35 ML/M2
ECHO LV EDV NDEX A2C: 35 ML/M2
ECHO LV EF PHYSICIAN: 60 %
ECHO LV EJECTION FRACTION A2C: 58 %
ECHO LV EJECTION FRACTION A4C: 62 %
ECHO LV EJECTION FRACTION BIPLANE: 60 % (ref 55–100)
ECHO LV ESV A2C: 29 ML
ECHO LV ESV A4C: 27 ML
ECHO LV ESV INDEX A2C: 15 ML/M2
ECHO LV ESV INDEX A4C: 14 ML/M2
ECHO LV FRACTIONAL SHORTENING: 32 % (ref 28–44)
ECHO LV GLOBAL LONGITUDINAL STRAIN (GLS): -17.3 %
ECHO LV INTERNAL DIMENSION DIASTOLE INDEX: 2.05 CM/M2
ECHO LV INTERNAL DIMENSION DIASTOLIC: 4.1 CM (ref 4.2–5.9)
ECHO LV INTERNAL DIMENSION SYSTOLIC INDEX: 1.4 CM/M2
ECHO LV INTERNAL DIMENSION SYSTOLIC: 2.8 CM
ECHO LV IVSD: 1.1 CM (ref 0.6–1)
ECHO LV MASS 2D: 151.3 G (ref 88–224)
ECHO LV MASS INDEX 2D: 75.6 G/M2 (ref 49–115)
ECHO LV POSTERIOR WALL DIASTOLIC: 1.1 CM (ref 0.6–1)
ECHO LV RELATIVE WALL THICKNESS RATIO: 0.54
ECHO LVOT AREA: 3.1 CM2
ECHO LVOT AV VTI INDEX: 0.64
ECHO LVOT DIAM: 2 CM
ECHO LVOT MEAN GRADIENT: 2 MMHG
ECHO LVOT PEAK GRADIENT: 3 MMHG
ECHO LVOT PEAK VELOCITY: 0.9 M/S
ECHO LVOT STROKE VOLUME INDEX: 31.4 ML/M2
ECHO LVOT SV: 62.8 ML
ECHO LVOT VTI: 20 CM
ECHO MV A VELOCITY: 0.88 M/S
ECHO MV AREA VTI: 1.7 CM2
ECHO MV E DECELERATION TIME (DT): 283 MS
ECHO MV E VELOCITY: 0.66 M/S
ECHO MV E/A RATIO: 0.75
ECHO MV E/E' LATERAL: 8.92
ECHO MV E/E' RATIO (AVERAGED): 10.78
ECHO MV E/E' SEPTAL: 12.64
ECHO MV LVOT VTI INDEX: 1.85
ECHO MV MAX VELOCITY: 1 M/S
ECHO MV MEAN GRADIENT: 2 MMHG
ECHO MV MEAN VELOCITY: 0.6 M/S
ECHO MV PEAK GRADIENT: 4 MMHG
ECHO MV VTI: 37 CM
ECHO PV MAX VELOCITY: 0.8 M/S
ECHO PV PEAK GRADIENT: 3 MMHG
ECHO RIGHT VENTRICULAR SYSTOLIC PRESSURE (RVSP): 29 MMHG
ECHO RV BASAL DIMENSION: 3.5 CM
ECHO RV FREE WALL PEAK S': 11.3 CM/S
ECHO RV TAPSE: 1.7 CM (ref 1.7–?)
ECHO TV REGURGITANT MAX VELOCITY: 2.53 M/S
ECHO TV REGURGITANT PEAK GRADIENT: 26 MMHG
EGFR, POC: 63 ML/MIN/1.73M2
POC CREATININE: 1.2 MG/DL (ref 0.51–1.19)

## 2025-09-05 PROCEDURE — 71275 CT ANGIOGRAPHY CHEST: CPT

## 2025-09-05 PROCEDURE — 93306 TTE W/DOPPLER COMPLETE: CPT

## 2025-09-05 PROCEDURE — 6360000004 HC RX CONTRAST MEDICATION: Performed by: INTERNAL MEDICINE

## 2025-09-05 PROCEDURE — 82565 ASSAY OF CREATININE: CPT

## 2025-09-05 RX ORDER — IOPAMIDOL 755 MG/ML
100 INJECTION, SOLUTION INTRAVASCULAR
Status: COMPLETED | OUTPATIENT
Start: 2025-09-05 | End: 2025-09-05

## 2025-09-05 RX ADMIN — IOPAMIDOL 100 ML: 755 INJECTION, SOLUTION INTRAVENOUS at 10:23
